# Patient Record
Sex: FEMALE | Race: OTHER
[De-identification: names, ages, dates, MRNs, and addresses within clinical notes are randomized per-mention and may not be internally consistent; named-entity substitution may affect disease eponyms.]

---

## 2019-07-31 ENCOUNTER — APPOINTMENT (OUTPATIENT)
Dept: BARIATRICS | Facility: CLINIC | Age: 34
End: 2019-07-31
Payer: MEDICAID

## 2019-07-31 VITALS
DIASTOLIC BLOOD PRESSURE: 81 MMHG | TEMPERATURE: 98.9 F | HEIGHT: 64.5 IN | SYSTOLIC BLOOD PRESSURE: 125 MMHG | OXYGEN SATURATION: 99 % | WEIGHT: 183.25 LBS | HEART RATE: 84 BPM | BODY MASS INDEX: 30.9 KG/M2

## 2019-07-31 DIAGNOSIS — E66.9 OBESITY, UNSPECIFIED: ICD-10-CM

## 2019-07-31 DIAGNOSIS — Z87.891 PERSONAL HISTORY OF NICOTINE DEPENDENCE: ICD-10-CM

## 2019-07-31 PROCEDURE — 99202 OFFICE O/P NEW SF 15 MIN: CPT

## 2019-07-31 NOTE — REASON FOR VISIT
[Initial Consult] : an initial consult for [S/P Bariatric Surgery] : s/p bariatric surgery [GERD] : gerd

## 2019-07-31 NOTE — HISTORY OF PRESENT ILLNESS
[de-identified] : s/p lagb 10 years ago and has mantained weigtht loss and bmi around 30\par comes today after having baby and having increased gerd and emesis \par had egd which showed hiatal hernia and grade esophagitis \par not on any meds \par today removed 1.5 cc of fluid told to return asap if symptoms do not resolve and get ugi to r/o slippage

## 2019-08-08 ENCOUNTER — APPOINTMENT (OUTPATIENT)
Dept: PLASTIC SURGERY | Facility: CLINIC | Age: 34
End: 2019-08-08

## 2019-08-15 ENCOUNTER — APPOINTMENT (OUTPATIENT)
Dept: PLASTIC SURGERY | Facility: CLINIC | Age: 34
End: 2019-08-15

## 2019-09-09 ENCOUNTER — APPOINTMENT (OUTPATIENT)
Dept: OBGYN | Facility: CLINIC | Age: 34
End: 2019-09-09
Payer: MEDICAID

## 2019-09-09 ENCOUNTER — OUTPATIENT (OUTPATIENT)
Dept: OUTPATIENT SERVICES | Facility: HOSPITAL | Age: 34
LOS: 1 days | Discharge: HOME | End: 2019-09-09

## 2019-09-09 ENCOUNTER — RESULT CHARGE (OUTPATIENT)
Age: 34
End: 2019-09-09

## 2019-09-09 VITALS
HEIGHT: 64.5 IN | WEIGHT: 173 LBS | DIASTOLIC BLOOD PRESSURE: 58 MMHG | BODY MASS INDEX: 29.18 KG/M2 | SYSTOLIC BLOOD PRESSURE: 108 MMHG

## 2019-09-09 DIAGNOSIS — Z80.3 FAMILY HISTORY OF MALIGNANT NEOPLASM OF BREAST: ICD-10-CM

## 2019-09-09 DIAGNOSIS — Z98.890 OTHER SPECIFIED POSTPROCEDURAL STATES: ICD-10-CM

## 2019-09-09 DIAGNOSIS — Z30.09 ENCOUNTER FOR OTHER GENERAL COUNSELING AND ADVICE ON CONTRACEPTION: ICD-10-CM

## 2019-09-09 LAB — HCG UR QL: NEGATIVE

## 2019-09-09 PROCEDURE — 99203 OFFICE O/P NEW LOW 30 MIN: CPT

## 2019-09-09 RX ORDER — FLUOXETINE HYDROCHLORIDE 60 MG/1
60 TABLET ORAL
Refills: 0 | Status: ACTIVE | COMMUNITY

## 2019-09-09 RX ORDER — TRAZODONE HYDROCHLORIDE 100 MG/1
100 TABLET ORAL
Refills: 0 | Status: ACTIVE | COMMUNITY

## 2019-09-09 NOTE — HISTORY OF PRESENT ILLNESS
[Definite:  ___ (Date)] : the last menstrual period was [unfilled] [Sexually Active] : is sexually active [Male ___] : [unfilled] male [Monogamous] : is monogamous [de-identified] : last sa 4 months ago

## 2019-09-09 NOTE — CHIEF COMPLAINT
[Initial Visit] : initial GYN visit [FreeTextEntry1] : Pt here for consultation for paragard iud. Pt has a 4 month old child at home. Pt recently had a pap and gc/chlam with Dr Ross.

## 2019-09-10 DIAGNOSIS — Z30.09 ENCOUNTER FOR OTHER GENERAL COUNSELING AND ADVICE ON CONTRACEPTION: ICD-10-CM

## 2019-09-10 DIAGNOSIS — Z80.3 FAMILY HISTORY OF MALIGNANT NEOPLASM OF BREAST: ICD-10-CM

## 2019-09-16 ENCOUNTER — APPOINTMENT (OUTPATIENT)
Dept: OBGYN | Facility: CLINIC | Age: 34
End: 2019-09-16
Payer: MEDICAID

## 2019-09-16 ENCOUNTER — OUTPATIENT (OUTPATIENT)
Dept: OUTPATIENT SERVICES | Facility: HOSPITAL | Age: 34
LOS: 1 days | Discharge: HOME | End: 2019-09-16

## 2019-09-16 DIAGNOSIS — Z30.430 ENCOUNTER FOR INSERTION OF INTRAUTERINE CONTRACEPTIVE DEVICE: ICD-10-CM

## 2019-09-16 PROCEDURE — 58300 INSERT INTRAUTERINE DEVICE: CPT

## 2019-09-16 NOTE — PROCEDURE
[IUD Placement] : intrauterine device (IUD) placement [Prevention of Pregnancy] : prevention of pregnancy [Risks] : risks [Benefits] : benefits [Alternatives] : alternatives [Infection] : infection [Uterine Perforation] : uterine perforation [Bleeding] : bleeding [CONSENT OBTAINED] : written consent was obtained prior to the procedure. [Neg Pregnancy Test] : a pregnancy test was negative [Neg GC/Chlamydia] : a a serum GC/Chlamydia was negative [No Premedication] : No premedication [Betadine] : Prepped with Betadine [None] : none [Tenaculum] : a single toothed tenaculum [Easy Passage] : allowed easy passage of a uterine sound without dilation [Lot Number: ___] : IUD lot number: [unfilled] [ParaGard IUD] : The ParaGard IUD was inserted to the fundus of the uterus.  The IUD strings were cut to an appropriate length. [Tolerated Well] : the patient tolerated the procedure well [No Complications] : there were no complications

## 2019-09-20 DIAGNOSIS — Z30.430 ENCOUNTER FOR INSERTION OF INTRAUTERINE CONTRACEPTIVE DEVICE: ICD-10-CM

## 2019-10-17 ENCOUNTER — APPOINTMENT (OUTPATIENT)
Dept: OBGYN | Facility: CLINIC | Age: 34
End: 2019-10-17

## 2019-10-29 ENCOUNTER — APPOINTMENT (OUTPATIENT)
Dept: PLASTIC SURGERY | Facility: CLINIC | Age: 34
End: 2019-10-29